# Patient Record
Sex: FEMALE | Race: BLACK OR AFRICAN AMERICAN
[De-identification: names, ages, dates, MRNs, and addresses within clinical notes are randomized per-mention and may not be internally consistent; named-entity substitution may affect disease eponyms.]

---

## 2018-11-22 ENCOUNTER — HOSPITAL ENCOUNTER (EMERGENCY)
Dept: HOSPITAL 35 - ER | Age: 83
Discharge: HOME | End: 2018-11-22
Payer: COMMERCIAL

## 2018-11-22 VITALS — SYSTOLIC BLOOD PRESSURE: 123 MMHG | DIASTOLIC BLOOD PRESSURE: 86 MMHG

## 2018-11-22 VITALS — BODY MASS INDEX: 25.49 KG/M2 | HEIGHT: 61 IN | WEIGHT: 135.01 LBS

## 2018-11-22 DIAGNOSIS — Z90.49: ICD-10-CM

## 2018-11-22 DIAGNOSIS — L03.011: Primary | ICD-10-CM

## 2020-12-17 ENCOUNTER — HOSPITAL ENCOUNTER (INPATIENT)
Dept: HOSPITAL 35 - ER | Age: 85
LOS: 5 days | Discharge: HOME | DRG: 326 | End: 2020-12-22
Attending: INTERNAL MEDICINE | Admitting: INTERNAL MEDICINE
Payer: MEDICARE

## 2020-12-17 VITALS — BODY MASS INDEX: 20.62 KG/M2 | WEIGHT: 109.2 LBS | HEIGHT: 60.98 IN

## 2020-12-17 VITALS — DIASTOLIC BLOOD PRESSURE: 74 MMHG | SYSTOLIC BLOOD PRESSURE: 176 MMHG

## 2020-12-17 DIAGNOSIS — K44.0: Primary | ICD-10-CM

## 2020-12-17 DIAGNOSIS — G92: ICD-10-CM

## 2020-12-17 DIAGNOSIS — N39.0: ICD-10-CM

## 2020-12-17 DIAGNOSIS — K56.2: ICD-10-CM

## 2020-12-17 DIAGNOSIS — Z20.828: ICD-10-CM

## 2020-12-17 LAB
ANION GAP SERPL CALC-SCNC: 9 MMOL/L (ref 7–16)
BASOPHILS NFR BLD AUTO: 0.1 % (ref 0–2)
BUN SERPL-MCNC: 9 MG/DL (ref 7–18)
CALCIUM SERPL-MCNC: 9.6 MG/DL (ref 8.5–10.1)
CHLORIDE SERPL-SCNC: 105 MMOL/L (ref 98–107)
CO2 SERPL-SCNC: 27 MMOL/L (ref 21–32)
CREAT SERPL-MCNC: 1 MG/DL (ref 0.6–1)
EOSINOPHIL NFR BLD: 0.3 % (ref 0–3)
ERYTHROCYTE [DISTWIDTH] IN BLOOD BY AUTOMATED COUNT: 14.4 % (ref 10.5–14.5)
GLUCOSE SERPL-MCNC: 116 MG/DL (ref 74–106)
GRANULOCYTES NFR BLD MANUAL: 83.7 % (ref 36–66)
HCT VFR BLD CALC: 30.8 % (ref 37–47)
HGB BLD-MCNC: 10 GM/DL (ref 12–15)
LYMPHOCYTES NFR BLD AUTO: 11.5 % (ref 24–44)
MCH RBC QN AUTO: 27.4 PG (ref 26–34)
MCHC RBC AUTO-ENTMCNC: 32.4 G/DL (ref 28–37)
MCV RBC: 84.6 FL (ref 80–100)
MONOCYTES NFR BLD: 4.4 % (ref 1–8)
NEUTROPHILS # BLD: 6.6 THOU/UL (ref 1.4–8.2)
PLATELET # BLD: 223 THOU/UL (ref 150–400)
POTASSIUM SERPL-SCNC: 3.2 MMOL/L (ref 3.5–5.1)
RBC # BLD AUTO: 3.64 MIL/UL (ref 4.2–5)
SODIUM SERPL-SCNC: 141 MMOL/L (ref 136–145)
WBC # BLD AUTO: 8.3 THOU/UL (ref 4–11)

## 2020-12-17 PROCEDURE — 62900: CPT

## 2020-12-17 PROCEDURE — 53307: CPT

## 2020-12-17 PROCEDURE — 52265 CYSTOSCOPY AND TREATMENT: CPT

## 2020-12-17 PROCEDURE — 56525: CPT

## 2020-12-17 PROCEDURE — 50555 KIDNEY ENDOSCOPY & BIOPSY: CPT

## 2020-12-17 PROCEDURE — 50101: CPT

## 2020-12-17 PROCEDURE — 56526: CPT

## 2020-12-17 PROCEDURE — 53310: CPT

## 2020-12-17 PROCEDURE — 70005: CPT

## 2020-12-17 PROCEDURE — 62110: CPT

## 2020-12-17 PROCEDURE — 50010 RENAL EXPLORATION: CPT

## 2020-12-17 PROCEDURE — 56462: CPT

## 2020-12-17 PROCEDURE — 10195: CPT

## 2020-12-17 PROCEDURE — 57092: CPT

## 2020-12-17 PROCEDURE — 50386 REMOVE STENT VIA TRANSURETH: CPT

## 2020-12-17 PROCEDURE — 52266: CPT

## 2020-12-17 PROCEDURE — 50445: CPT

## 2020-12-17 NOTE — P
[facility]
Karen Crystal
[place]   [address]                     PROCEDURE REPORT              
_______________________________________________________________________________
 
Name:       HAYDEN HALL                 Room #:         445-P       ADM IN  
M.R.#:      [id number]                       Account #:      [id number]  
Admission:  12/18/20    Attend Phys:    Reed Nguyen MD      
Discharge:              Date of Birth:  [birth year]  
                                                          Report #: [id number]
                                                                    [id number]   
_______________________________________________________________________________
THIS REPORT FOR:  
 
cc:  FAM - No family physician/PCP 
     FAM - No family physician/PCP                                        
     Juvencio Beaulieu MD                                          ~
 
DATE OF SERVICE:  12/18/2020
 
 
ROOM:  #8.
 
This is an upper endoscopy report.
 
INDICATION:  Suspected gastric volvulus on CT.
 
DESCRIPTION OF PROCEDURE: After general anesthesia, the patient was intubated
due to unknown COVID results and gastric outlet obstruction.  The adult
endoscope was introduced through the mouth and advanced all the way to the third
portion of the duodenum, then withdrawn with careful inspection.  There was mild
esophagitis.  The NG was in place.  There is a large hiatal hernia and a 2-liter
food and viscous fluid bezoar in the body of the stomach that was suctioned. 
There was mild pyloric stenosis, but the adult endoscope was able to be
readvanced into the duodenum 3 times.  There was a large J-shaped stomach, but
no obvious obstruction or ulcer, underlying the bezoar.  This was discussed with
surgery and plans for upper GI series and observation.  Continue NG to
intermittent suction.  We will follow along with you.
 
 
 
 
 
 
 
 
 
 
 
 
 
 
 
 
 
 
                         
   By:                               
                   
D: 12/18/20 [facility]                           _____________________________________
T: 12/18/20 1945                           Juvencio Beaulieu MD            /nt

## 2020-12-17 NOTE — EKG
[address]  [address]
Phone:  [phone]                    ELECTROCARDIOGRAM REPORT      
_______________________________________________________________________________
 
Name:       RAFAELHAYDEN                 Room #:         445-P       ADM IN  
M.R.#:      [id number]     Account #:      [id number]  
Admission:  20    Attend Phys:    Reed Nguyen MD      
Discharge:              Date of Birth:  [birth year]  
                                                          Report #: [id number]
   [id number]
_______________________________________________________________________________
                         [facility] ED
                                       
Test Date:    2020               Test Time:    03:35:58
Pat Name:     HAYDEN HALL            Department:   
Patient ID:   [id number]            Room:         [facility]
Gender:       F                        Technician:   DAREN
:          [id number]               Requested By: Yesenia Quintana
Order Number: [id number] MD:     
                                 Measurements
Intervals                              Axis          
Rate:         86                       P:            58
UT:           169                      QRS:          -30
QRSD:         106                      T:            19
QT:           414                                    
QTc:          496                                    
                           Interpretive Statements
Sinus rhythm
Probable left atrial enlargement
Left axis deviation
Borderline prolonged QT interval
No previous ECG available for comparison
[email or web address]
 
 
 
 
 
 
 
 
 
 
 
 
 
 
 
 
 
 
 
 
 
                         
   By:                               
                   
D: 20                           _____________________________________
T: 20 033                           Epiphany MD Tam           /EPI

## 2020-12-18 VITALS — SYSTOLIC BLOOD PRESSURE: 176 MMHG | DIASTOLIC BLOOD PRESSURE: 74 MMHG

## 2020-12-18 VITALS — DIASTOLIC BLOOD PRESSURE: 76 MMHG | SYSTOLIC BLOOD PRESSURE: 158 MMHG

## 2020-12-18 VITALS — DIASTOLIC BLOOD PRESSURE: 76 MMHG | SYSTOLIC BLOOD PRESSURE: 152 MMHG

## 2020-12-18 VITALS — SYSTOLIC BLOOD PRESSURE: 120 MMHG | DIASTOLIC BLOOD PRESSURE: 91 MMHG

## 2020-12-18 VITALS — SYSTOLIC BLOOD PRESSURE: 171 MMHG | DIASTOLIC BLOOD PRESSURE: 90 MMHG

## 2020-12-18 VITALS — SYSTOLIC BLOOD PRESSURE: 137 MMHG | DIASTOLIC BLOOD PRESSURE: 76 MMHG

## 2020-12-18 LAB
ALBUMIN SERPL-MCNC: 3.8 G/DL (ref 3.4–5)
ALT SERPL-CCNC: 22 U/L (ref 14–59)
AST SERPL-CCNC: 25 U/L (ref 15–37)
BACTERIA-REFLEX: (no result) /HPF
BILIRUB DIRECT SERPL-MCNC: < 0.1 MG/DL
BILIRUB SERPL-MCNC: 0.5 MG/DL (ref 0.2–1)
BILIRUB UR-MCNC: NEGATIVE MG/DL
COLOR UR: YELLOW
KETONES UR STRIP-MCNC: NEGATIVE MG/DL
MUCUS: (no result) STRN/LPF
PROT SERPL-MCNC: 7.7 G/DL (ref 6.4–8.2)
RBC # UR STRIP: (no result) /UL
RBC #/AREA URNS HPF: (no result) /HPF (ref 0–2)
SP GR UR STRIP: 1.01 (ref 1–1.03)
SQUAMOUS: (no result) /LPF (ref 0–3)
TRANSITIONAL EPITHEL CELL: (no result) /LPF
TROPONIN I SERPL-MCNC: <0.06 NG/ML (ref ?–0.06)
URINE CLARITY: (no result)
URINE GLUCOSE-RANDOM*: NEGATIVE
URINE LEUKOCYTES-REFLEX: (no result)
URINE NITRITE-REFLEX: NEGATIVE
URINE PROTEIN (DIPSTICK): NEGATIVE
URINE WBC-REFLEX: (no result) /HPF (ref 0–5)
UROBILINOGEN UR STRIP-ACNC: 0.2 E.U./DL (ref 0.2–1)

## 2020-12-18 PROCEDURE — 0BQT0ZZ REPAIR DIAPHRAGM, OPEN APPROACH: ICD-10-PCS

## 2020-12-18 PROCEDURE — 0DH60UZ INSERTION OF FEEDING DEVICE INTO STOMACH, OPEN APPROACH: ICD-10-PCS

## 2020-12-18 PROCEDURE — 0BJT4ZZ INSPECTION OF DIAPHRAGM, PERCUTANEOUS ENDOSCOPIC APPROACH: ICD-10-PCS

## 2020-12-18 NOTE — NUR
Assumed care pt at 0700. Pt confused. NG tube in place. Restraints in place.
Talked to pt's family and updated on pt's status. IVF and IV antibiotcs
infusing. GI consulted. Plan for EGD. Covid swab sent to lab. Awaiting
results. RA. Fall precautions in place. Will continue to monitor.

## 2020-12-18 NOTE — NUR
assessment: CM REVIEWED CHART AND SPOKE WITH PT AS WELL AS HER DAUGHTER
MARTHA. PT IS ADMITTED DUE TO GASTRIC OUTLET OBSTRUCTION AND CURRENTLY HAS AN
NG. PLANS FOR POSSIBLE EGD. PT LIVES IN AN APT ALONE. PT HAS ABOUT 9 STEPS
WITH HANDRAILS TO ENTER THE APT. PT NORMALLY AMBULATES INDEPENDENLTY BUT DOES
HAVE A CANE AT HOME. PT HAS HAD HH IN THE PAST IN 2016 AFTER A KNEE SURGERY
BUT HAS NOT BEEN TO A SNF/REHAB THAT SHE IS AWARE OF.  AWAITING FURTHER PLANS
FOR PATIENT. PT WILL WORK WITH THERAPIES WHEN ABLE. CM WILL CONTINUE TO FOLLOW
TO ASSIST AS NEEDED.

## 2020-12-18 NOTE — NUR
PT WITH CONFUSION,FORGETFULNESS AND RESTLESSNESS. PT REQUIRES ALOT OF
REASSURANCE.PT WAS ABLE TO WIGGLE THRO RESTRAINTS AND PULL OFF THE NG OXYGEN
MASK AS WELL AS NG. NG WAS REAPPLIED.PTWAS INCONTINENT OF URINE, CLEANED AND
REPOSITIONED.SATTING AT 98% ON ROOM AIR AT THIS TIME.IV FLUIDS INFUSING THRO
THE LAC.AFEBRILE. NPO.NG TO LIS VIA R NARE.SOME SLIGHT EDEMA TO BLE.PT DENIES
PAIN OR NAUSEA.FALL PREC IN PLACE. WILL CONTINUE TO CLOSELY MONITOR.

## 2020-12-18 NOTE — NUR
PT ARRIVED ON THE UNIT FROM ER THIS AM. ALERT TO SELF. NG TUBE INTACT AND EVI
SOFT WRIST RESTRAINT INTACT. PER REPORT FROM ER FAMILY NOTIFIED. IV IN PLACE
WITH FLUIDS INFUSING. PT INCONTENT. NPO. 800CC OF GASTRIC FLUID OUT FROM
ARRIVAL. PT UNABLE TO VOICE NEEDS. FALL PREC IN PLACE AND CALL LIGHT AT REACH
REPORT GIVEN TO DAY NURSE TO CONT CARE.

## 2020-12-18 NOTE — NUR
PATIENT UNCONTROLLABLY MOVING IN BED, IV IN L AC REMOVED BY PATIENT, TWO
ATTEMPTS WERE MADE AT IV ACCESS WHEN PATIENT WOULD FLINCH AND IV ACCESS WAS
UNSUCCESFUL.

## 2020-12-19 VITALS — DIASTOLIC BLOOD PRESSURE: 88 MMHG | SYSTOLIC BLOOD PRESSURE: 140 MMHG

## 2020-12-19 VITALS — DIASTOLIC BLOOD PRESSURE: 76 MMHG | SYSTOLIC BLOOD PRESSURE: 155 MMHG

## 2020-12-19 LAB
ANION GAP SERPL CALC-SCNC: 15 MMOL/L (ref 7–16)
BUN SERPL-MCNC: 11 MG/DL (ref 7–18)
CALCIUM SERPL-MCNC: 9.3 MG/DL (ref 8.5–10.1)
CHLORIDE SERPL-SCNC: 106 MMOL/L (ref 98–107)
CO2 SERPL-SCNC: 23 MMOL/L (ref 21–32)
CREAT SERPL-MCNC: 1 MG/DL (ref 0.6–1)
GLUCOSE SERPL-MCNC: 74 MG/DL (ref 74–106)
POTASSIUM SERPL-SCNC: 3.1 MMOL/L (ref 3.5–5.1)
SODIUM SERPL-SCNC: 144 MMOL/L (ref 136–145)

## 2020-12-19 NOTE — NUR
ORDERS RECEIVED AND CHART REVIEWED, SPOKE WITH RN, LEONIDAS, CURRENTLY IN
SURGERY FOR HIATAL HERNIA REPAIR WITH GASTRNOMY. REQUST NEW ORDERS POST OP
WHEN APPROPRIATE FOR OT EVAL. THANK YOU.

## 2020-12-19 NOTE — NUR
Assumed care of pt at 0700. Pt confused and on restraints. NG tube in place.
Having surgery this am. Family at bedside. Denies pain. IVF infusing. Freqeunt
rounding. Will continue to monitor.

## 2020-12-20 VITALS — DIASTOLIC BLOOD PRESSURE: 87 MMHG | SYSTOLIC BLOOD PRESSURE: 161 MMHG

## 2020-12-20 VITALS — SYSTOLIC BLOOD PRESSURE: 150 MMHG | DIASTOLIC BLOOD PRESSURE: 89 MMHG

## 2020-12-20 VITALS — DIASTOLIC BLOOD PRESSURE: 74 MMHG | SYSTOLIC BLOOD PRESSURE: 129 MMHG

## 2020-12-20 VITALS — DIASTOLIC BLOOD PRESSURE: 81 MMHG | SYSTOLIC BLOOD PRESSURE: 146 MMHG

## 2020-12-20 NOTE — NUR
DTR MARTHA WILL BE GOING BACK TO WORK, ASKING IF PT'S OTHER DTR CAN COME TO
SEE PT. DTR AC TOWNSEND  ([phone]) WOULD BE THE REPLACEMENT VISITOR IF
APPROVED BY HANNA BOWMAN.

## 2020-12-20 NOTE — O
[facility]
Karen [address]
[place]   [address]                     OPERATIVE REPORT              
_______________________________________________________________________________
 
Name:       HAYDEN HALL                 Room #:         445-P       ADM IN  
M.R.#:      [id number]                       Account #:      [id number]  
Admission:  12/18/20    Attend Phys:    Reed Nguyen MD      
Discharge:              Date of Birth:  [birth year]  
                                                          Report #: [id number]
                                                                    [id number]   
_______________________________________________________________________________
THIS REPORT FOR:  
 
cc:  FAM - No family physician/PCP 
     FAM - No family physician/PCP                                        
     Phillip Tello MD                                     ~
 
DATE OF SERVICE:  12/19/2020
 
 
PREOPERATIVE DIAGNOSES:  Hiatal hernia, gastric volvulus.
 
POSTOPERATIVE DIAGNOSES:  Hiatal hernia, gastric volvulus.
 
OPERATIONS:
1.  Laparoscopic repair of hiatal hernia without mesh implantation without
fundoplication.
2.  Laparoscopic gastrostomy.
 
SURGEON:  Phillip Tello MD
 
ANESTHESIA:  General.
 
ESTIMATED BLOOD LOSS:  Minimal.
 
SPECIMEN:  None.
 
DESCRIPTION OF PROCEDURE:  After informed consent was obtained, the patient was
brought to the operating room and placed supine.  SCDs were placed and working,
preoperative antibiotics were administered, general anesthesia was induced.  The
abdomen was prepped and draped in the usual sterile fashion.  A 5 mm incision
was made in the left upper quadrant.  A 5 mm trocar was placed under direct
vision.  Pneumoperitoneum was established.  Three more laparoscopic ports were
placed along the line approximately 7 cm apart at the level of the umbilicus.
 
A Dereje retractor was inserted through an incision in the epigastrium and
retracted the liver anteriorly and superiorly.  It was fastened down.  This
allowed visualization of the hiatus.
 
The pars flaccida was incised.  This was done with the LigaSure device.  Cautery
dissection was then made up to the right judy, which was identified.  The
phrenoesophageal ligament was incised using the LigaSure.  The left judy was
identified.  She had a small hiatal hernia, which was fully reduced.  I was then
able to examine the stomach.  It was in a normal anatomic position and not
volvulized at this time.
 
The stomach did appear somewhat dilated, but otherwise appeared normal.
 
 
 
[facility]
[address] [place]   [address]                     OPERATIVE REPORT              
_______________________________________________________________________________
 
Name:       HAYDEN HALL                 Room #:         445-P       [facility] [place]  
M.R.#:      [id number]                       Account #:      [id number]  
Admission:  12/18/20    Attend Phys:    Reed Nguyen MD      
Discharge:              Date of Birth:  [birth year]  
                                                          Report #: [id number]
                                                                    [id number]   
_______________________________________________________________________________
 
 
Laparoscopic repair of the hiatal hernia was performed.  This was done with a
single 2-0 Ethibond suture to reapproximate the crura. This closed the defect
nicely, but was not too tight.
 
A gastrostomy tube was then performed.  Next, T-fasteners were placed through
incisions in the left upper quadrant.  They were pierced through the stomach and
then the stomach was then brought up to the abdominal wall. An incision was made
for the gastrostomy.  A Seldinger needle was used to cannulate the stomach
between the T-bars.  Wire was placed.  Dilator was placed.  A 12-Tanzanian
gastrostomy tube was placed through the dilator and sheath and the sheath was
peeled away.  Gastrostomy tube was inflated with 3 mL of normal saline.  The
T-fasteners were tied down and this brought the gastrostomy tube up to the
abdominal wall very nicely.
 
The Dereje retractor was then removed.  The ports were removed under direct
vision.  The skin was closed with 4-0 Monocryl.  Incisions were dressed with
Steri-Strips.
 
COMPLICATIONS:  None.
 
DISPOSITION:  The patient was taken to recovery in satisfactory condition.
 
 
 
 
 
 
 
 
 
 
 
 
 
 
 
 
 
 
 
 
 
  <ELECTRONICALLY SIGNED>
   By: Phillip Tello MD     
  12/20/20     1114
D: 12/19/20 1029                           _____________________________________
T: 12/19/20 1046                           Phillip Tello MD       /nt

## 2020-12-20 NOTE — NUR
PATIENT ALERT TO NAME ONLY. DAUGHTER HERE AT SHIFT CHANGE AND SPOKE WITH THIS
NURSE AND AM NURSE. PATIENT CONFUSED MAJORITY OF THE NIGHT. PATIENT ASKED TO
SPEAK WITH HER DAUGHTER (MARTHA) AROUND 2300. CALL WAS PLACED AND THEY SPOKE
AS WELL AS MARTHA SPEAKING TO THIS NURSE AND DESTINEY PEREZ. EXTERNAL CATHETER
WORKING OFF AND ON. PATIENT CHANGED FROM URINE INCONTINENCE X3 DURING THE
NIGHT. GIVEN ZOFRAN AND PAIN MEDICATION DURING THE NIGHT. PATIENT FOUND WITH
LEGS AT SIDE OF BED X2 AND ALARM SET OFF FREQUENTLY. STATES THAT SHE HAS TO GO
TO THE DOCTOR WITH HER SON. ALSO SEEING RELATIVES IN THE WINDOW. ATTEMPTS TO
REORIENTATE NOT HELPFUL. REQUIRES EXTRA TIME. WILL MONITOR. IVF INFUSING W/O
COMPLICATION.

## 2020-12-20 NOTE — NUR
PT ASSESSED AT START OF SHIFT. PT IN EVI WRIST RESTRAINTS UNTIL DAUGHTER
ARRIVED AT NOON. PT HAD BEEN PULLING AT LINES AND TRYING TO GET OUT OF BED.
HAS BEEN CALM SINCE DAUGHTER AT BEDSIDE. WILL REASSESS NEED FOR RESTRAINTS
ONCE DAUGHTER LEAVES. SURGEON IN AND ORDERS TO START CLEAR LIQUIDS AND SHE
HAS FED HERSELF AND DONE WELL. INCONTINENT OF BOWEL AND BLADDER. BRIEF IN
PLACE AND CHANGED AS NEEDED. LUQ DRAIN CLAMPED OFF PER DR. SALCIDO.

## 2020-12-21 VITALS — SYSTOLIC BLOOD PRESSURE: 140 MMHG | DIASTOLIC BLOOD PRESSURE: 90 MMHG

## 2020-12-21 VITALS — DIASTOLIC BLOOD PRESSURE: 93 MMHG | SYSTOLIC BLOOD PRESSURE: 175 MMHG

## 2020-12-21 VITALS — DIASTOLIC BLOOD PRESSURE: 76 MMHG | SYSTOLIC BLOOD PRESSURE: 142 MMHG

## 2020-12-21 VITALS — SYSTOLIC BLOOD PRESSURE: 166 MMHG | DIASTOLIC BLOOD PRESSURE: 97 MMHG

## 2020-12-21 VITALS — SYSTOLIC BLOOD PRESSURE: 123 MMHG | DIASTOLIC BLOOD PRESSURE: 89 MMHG

## 2020-12-21 VITALS — DIASTOLIC BLOOD PRESSURE: 90 MMHG | SYSTOLIC BLOOD PRESSURE: 140 MMHG

## 2020-12-21 NOTE — NUR
ASSUMED CARE OF PATIENT WHO IS CONFUSED AT TIMES. HAS IV FLUIDS INFUSING AS
ORDERED. HAS SOFT RESTRAINTS TO HELP PATIENT TO KEEP FROM PULLING OUT LINES.
HAS ABD DRAIN THAT IS IN PLACE. NO PAIN OR RESP DISTRESS AT THIS TIME. PT HAD
BED BATH AND LOTION TO BODY.

## 2020-12-21 NOTE — NUR
CM MET WITH PT ADN DTR AC AT BEDSIDE THIS DAY. PT AND OT HAD WORKED WITH
PT AND IT HAD BEEN INDICATED THAT PT'S PREFERENCE WOULD BE FOR PT TO DISCHARGE
TO HER DTR [facility]. IT HAD BEEN INDICATED THAT THERE IS SOMEONE IN
THE FAMILY THERE 24/7 THAT WOULD BE ABLE TO ATTEMD TO PT'S NEEDS. THEY ARE
RECEPTIVE TO HH UPON DC. THEY INDICATED NO PREFERENCE FOR PROVIDER. DTR
INDICATED THAT PT'S INSURANCE CHANGES AS OF JAN O1 TO [facility]. REFERRAL BEING SENT
TO [facility] FOR REVIEW FOR POSSIBLE SERVICES. CARE TEAM INDICATED PT WOULD
NEED A JR. FWW. CM NOTIFIED PP LIAISON. PT TO DC TO [facility]
[phone] [address]ESS [address] [place] [address]. PT'S PCP IS DR. CARLEY JOHNSON.

## 2020-12-21 NOTE — NUR
AT 1800 CALLED TO PATIENT'S ROOM PATIENT WAS SITTING ON FLOOR ON SIDE OF BED.
SHE STATED I DID NOT FALL DAUGHTER AND UNIT SECRETARY WAS IN ROOM. MOVES ALL
EXTREMITES NO OPEN AREAS NO BRUISING PATIENT AND HER DAUGHTER STATES SHE DID
NOT HIT HER HEAD. VS.98.1 18 97 154/87 O2 SAT = 92% ROOM AIR. PT ASSISTED TO
BED HAD BM WAS CLEANED AND CLOTHES CHANGED. IV FLUIDS INFUSING, PT ATE DINNER.
BED ALARM ON AND DAUGHTER EXPLAINED TO THAT IT  NEEDED TO BE ON. PT HAS ORDER
FOR RESTRAINTS. SUPERVISOR CALLED AND WAS TOLD WHAT HAD HAPPENED. DR CHERY
CALLED XS 2 NO CALL BACK AT THIS TIME, PATIENT TO DC HOME 12/22/20 HOME HEALTH
SET UP [facility] AND PT THERAPY CLEARED HER FOR DC.

## 2020-12-21 NOTE — NUR
ASSESSED AT START OF SHIFT. PT IN RESTRAINT VERY RESTLESS THIS SHIFT. PUTS
LEGS ON SIDE OF RAILS IN AN ATTEMPT TO GET UP. BED ALARM ON. CALLED ON CALL
RNP AND ONE TIME DOSE OF MELATONIN GIVEN. PT INCONTINENT OF URINE. SEVERAL BED
CHUCKS CHANGED. IV INTACT WITH FLUIDS INFUSING. FREQ ROUNDING DONE. PT ALERT
TO SELF VERY CONFUSED AND DIFFICULT TO REORIENT. WILL CONT TO MONITOR TILL
EOS.

## 2020-12-21 NOTE — NUR
[facility] IN AD[facility] THEY CAN ACCEPT
AND HAVE ADDRESS TO PT'S DTR'S HOME.

## 2020-12-22 VITALS — DIASTOLIC BLOOD PRESSURE: 90 MMHG | SYSTOLIC BLOOD PRESSURE: 140 MMHG

## 2020-12-22 VITALS — SYSTOLIC BLOOD PRESSURE: 140 MMHG | DIASTOLIC BLOOD PRESSURE: 90 MMHG

## 2020-12-22 VITALS — SYSTOLIC BLOOD PRESSURE: 153 MMHG | DIASTOLIC BLOOD PRESSURE: 87 MMHG

## 2020-12-22 VITALS — SYSTOLIC BLOOD PRESSURE: 135 MMHG | DIASTOLIC BLOOD PRESSURE: 72 MMHG

## 2020-12-22 LAB
ANION GAP SERPL CALC-SCNC: 11 MMOL/L (ref 7–16)
BUN SERPL-MCNC: 7 MG/DL (ref 7–18)
CALCIUM SERPL-MCNC: 9.1 MG/DL (ref 8.5–10.1)
CHLORIDE SERPL-SCNC: 104 MMOL/L (ref 98–107)
CO2 SERPL-SCNC: 25 MMOL/L (ref 21–32)
CREAT SERPL-MCNC: 0.7 MG/DL (ref 0.6–1)
ERYTHROCYTE [DISTWIDTH] IN BLOOD BY AUTOMATED COUNT: 14.4 % (ref 10.5–14.5)
GLUCOSE SERPL-MCNC: 99 MG/DL (ref 74–106)
HCT VFR BLD CALC: 31.4 % (ref 37–47)
HGB BLD-MCNC: 10.3 GM/DL (ref 12–15)
MAGNESIUM SERPL-MCNC: 1.6 MG/DL (ref 1.8–2.4)
MCH RBC QN AUTO: 27.5 PG (ref 26–34)
MCHC RBC AUTO-ENTMCNC: 32.9 G/DL (ref 28–37)
MCV RBC: 83.6 FL (ref 80–100)
PLATELET # BLD: 254 THOU/UL (ref 150–400)
POTASSIUM SERPL-SCNC: 2.8 MMOL/L (ref 3.5–5.1)
RBC # BLD AUTO: 3.76 MIL/UL (ref 4.2–5)
SODIUM SERPL-SCNC: 140 MMOL/L (ref 136–145)
WBC # BLD AUTO: 8 THOU/UL (ref 4–11)

## 2020-12-22 NOTE — NUR
Assumed pt care @ 1900. A/Ox1-2,confused and impulsive at times.VSS. Denies
pain on assessment. Soft limb restraints in place, released every 2 hours for
skin checks. Incontinent of B&B,fall precautions in place, frequent checks on
pt. IVF infusing via RFA w/o any problems. Will continue top monitor pt.

## 2020-12-22 NOTE — NUR
CARE TEAM INDICATED THAT PT IS MEDICALLY STABLE TO DC TO HER DTR MARTHA [facility] THIS DAY. PT WAS ISSUED A JR. FWW FOR USE AT HOME BY PROVIDER PLUS. [facility] PT HAD BEEN ACCEPTED FOR HH
SERVICES BY [facility]. ORDERS FAXED. NO OTHER CM INTERVENTION INDICATED.
CASE CLOSED.

## 2020-12-22 NOTE — NUR
FAXED DC ORDERS/SUMMARY TO [facility] SPOKE WITH MAR IN INTAKE SHE
RECEIVED ORDERS AND WILL ARRANGE VISITS WITH PT.

## 2020-12-22 NOTE — NUR
DISCHARGE PAPERS REVIEWED WITH PATIENT AND HER DAUGHTER SIGNED AND COPY IN
CHART. 1 TIME KCL PO GIVEN IV ACSESS DCD. ALL BELONGINGS PACKED INCLUDING
WALKER THAT WAS DELIEVERED TO BE SENT WITH PATIENT. PT ASSIST XS 1 TO W/C.
TAKEN TO FAMILY CAR FOR DISCHARGE, NO PAIN OR RESP DISTRESS NOTED.

## 2020-12-22 NOTE — NUR
ASSUMED CARE OF PATIENT SHE IS SITTING UP IN BED WATCHING TV DRINKING OJ AND
COFFEE. NO PAIN OR RESP DISTRESS NOTED.